# Patient Record
Sex: MALE | Race: WHITE | NOT HISPANIC OR LATINO | Employment: FULL TIME | URBAN - METROPOLITAN AREA
[De-identification: names, ages, dates, MRNs, and addresses within clinical notes are randomized per-mention and may not be internally consistent; named-entity substitution may affect disease eponyms.]

---

## 2017-12-06 ENCOUNTER — ALLSCRIPTS OFFICE VISIT (OUTPATIENT)
Dept: OTHER | Facility: OTHER | Age: 37
End: 2017-12-06

## 2017-12-06 LAB
CLARITY UR: NORMAL
COLOR UR: YELLOW
GLUCOSE (HISTORICAL): NORMAL
HGB UR QL STRIP.AUTO: NORMAL
KETONES UR STRIP-MCNC: NORMAL MG/DL
LEUKOCYTE ESTERASE UR QL STRIP: NORMAL
NITRITE UR QL STRIP: NORMAL
PH UR STRIP.AUTO: 7 [PH]
PROT UR STRIP-MCNC: NORMAL MG/DL
SP GR UR STRIP.AUTO: 1.01

## 2017-12-07 NOTE — CONSULTS
Assessment  1  Encounter for vasectomy counseling (V25 09) (Z30 09)    Plan  Encounter for vasectomy counseling    · LORazepam 2 MG Oral Tablet; one tablet one hour prior to procedure   Rx By: Mikki Merritt; Dispense: 1 Days ; #:1 Tablet; Refill: 0;Encounter for vasectomy counseling; JORDAN = N; Print Rx   · Urine Dip Non-Automated- POC; Status:Complete - Retrospective By ProtocolAuthorization;   Done: [id number] 10:07AM   Performed: In Office; [phone]; Last Updated By:Gene Leon; 12/6/2017 10:13:00 AM;Ordered;For:Encounter for vasectomy counseling; Ordered By:Libra Pérez;   · Vasectomy - POC; Status:Active - Perform Order; Requested for:[id number];    Perform: In Office; [phone]; Ordered;for vasectomy counseling; Ordered By:Libra Pérez; Discussion/Summary  Discussion Summary:   impression is request for elective sterilization vasectomy  and benefits of the procedure were discussed and reviewed  Informed consent was obtained in the office today  The patient was prescribed Ativan to take one hour prior to the procedure  He understands that he will require transportation to and from the office that day  He also understands he will require 2 semen analyses at 6 and 8 weeks post procedure  In the interim he will require contraception to avoid an undesired pregnancy  Self Referrals:   Self Referrals: Yes      Chief Complaint  Chief Complaint Free Text Note Form: patient presents for vasectomy consult      History of Present Illness  HPI: Jc Landaverde is a 80-year-old male who requests elective sterilization with vasectomy  He has been  for 12 years  He has 2 children ages 3 and 11  He denies any lower urinary tract symptoms or hematuria  He states that he is otherwise in excellent health surgical, family, and social histories were reviewed in [employer]        Review of Systems  Complete-Male Urology:  Constitutional: No fever or chills, feels well, no tiredness, no recent weight gain or weight loss  Respiratory: No complaints of shortness of breath, no wheezing, no cough, no SOB on exertion, no orthopnea or PND  Cardiovascular: No complaints of slow heart rate, no fast heart rate, no chest pain, no palpitations, no leg claudication, no lower extremity  Gastrointestinal: No complaints of abdominal pain, no constipation, no nausea or vomiting, no diarrhea or bloody stools  Genitourinary: Empty sensation-- and-- stream quality good, but-- as noted in HPI,-- no dysuria,-- no urinary hesitancy,-- no hematuria,-- no incontinence,-- no nocturia-- and-- no feelings of urinary urgency  Musculoskeletal: No complaints of arthralgia, no myalgias, no joint swelling or stiffness, no limb pain or swelling  Integumentary: No complaints of skin rash or skin lesions, no itching, no skin wound, no dry skin  Hematologic/Lymphatic: No complaints of swollen glands, no swollen glands in the neck, does not bleed easily, no easy bruising  Neurological: No compliants of headache, no confusion, no convulsions, no numbness or tingling, no dizziness or fainting, no limb weakness, no difficulty walking  ROS Reviewed:   ROS reviewed  Active Problems  1  Encounter for vasectomy counseling (V25 09) (Z30 09)    Past Medical History  1  History of renal calculi (V13 01) (I29 132)  Active Problems And Past Medical History Reviewed: The active problems and past medical history were reviewed and updated today  Surgical History  Surgical History Reviewed: The surgical history was reviewed and updated today  Family History  Mother    1  Family history of diabetes mellitus (V18 0) (Z83 3)  Family History Reviewed: The family history was reviewed and updated today  Social History   · Employed   · Never smoked tobacco (V49 89) (Z78 9)   · Social alcohol use (Z78 9)  Social History Reviewed: The social history was reviewed and updated today  The social history was reviewed and is unchanged        Current Meds   1  No Reported Medications Recorded  Medication List Reviewed: The medication list was reviewed and updated today  Allergies  1  Sulfa Drugs    Vitals  Vital Signs    Recorded: [id number] 10:09AM   Heart Rate 64   Systolic 678   Diastolic 78   Height 6 ft 1 in   Weight 171 lb 4 oz   BMI Calculated 22 59   BSA Calculated 2 01       Physical Exam   Additional Exam:  On examination he is in no acute distress  His abdomen is soft nontender nondistended   examination reveals normal phallus, scrotum and scrotal contents  Vasa deferentia easily palpable in the midline  Skin is warm  Extremities without edema   Neurologic is grossly intact and nonfocal  Gait normal  Affect normal       Results/Data  Urine Dip Non-Automated- POC [id number] 10:07AM Luis Paz     Test Name Result Flag Reference   Color Yellow       Clarity Transparent     Leukocytes -     Nitrite -     Blood -     Protein -     Ph 7 0     Specific Gravity 1 015     Ketone -     Glucose -           Signatures   Electronically signed by : Nusrat Black MD; Dec  6 2017 10:38AM EST                       (Author)

## 2018-01-05 ENCOUNTER — APPOINTMENT (OUTPATIENT)
Dept: LAB | Facility: HOSPITAL | Age: 38
End: 2018-01-05
Attending: UROLOGY
Payer: COMMERCIAL

## 2018-01-05 ENCOUNTER — GENERIC CONVERSION - ENCOUNTER (OUTPATIENT)
Dept: OTHER | Facility: OTHER | Age: 38
End: 2018-01-05

## 2018-01-05 DIAGNOSIS — Z30.2 ENCOUNTER FOR STERILIZATION: ICD-10-CM

## 2018-01-05 PROCEDURE — 88302 TISSUE EXAM BY PATHOLOGIST: CPT

## 2018-01-23 VITALS
HEIGHT: 73 IN | WEIGHT: 171.25 LBS | BODY MASS INDEX: 22.7 KG/M2 | HEART RATE: 64 BPM | DIASTOLIC BLOOD PRESSURE: 78 MMHG | SYSTOLIC BLOOD PRESSURE: 116 MMHG

## 2018-01-24 VITALS
HEART RATE: 60 BPM | BODY MASS INDEX: 22.56 KG/M2 | DIASTOLIC BLOOD PRESSURE: 80 MMHG | SYSTOLIC BLOOD PRESSURE: 120 MMHG | WEIGHT: 171 LBS

## 2018-01-30 PROBLEM — Z98.52 STATUS POST VASECTOMY: Status: ACTIVE | Noted: 2018-01-30

## 2018-02-02 ENCOUNTER — OFFICE VISIT (OUTPATIENT)
Dept: UROLOGY | Facility: AMBULATORY SURGERY CENTER | Age: 38
End: 2018-02-02

## 2018-02-02 VITALS
WEIGHT: 170 LBS | DIASTOLIC BLOOD PRESSURE: 80 MMHG | SYSTOLIC BLOOD PRESSURE: 140 MMHG | BODY MASS INDEX: 22.53 KG/M2 | HEIGHT: 73 IN | HEART RATE: 60 BPM

## 2018-02-02 DIAGNOSIS — Z98.52 STATUS POST VASECTOMY: Primary | ICD-10-CM

## 2018-02-02 PROCEDURE — 99024 POSTOP FOLLOW-UP VISIT: CPT | Performed by: NURSE PRACTITIONER

## 2018-02-02 NOTE — PROGRESS NOTES
2/2/2018  Mary Whitmore II  1980  [id number]      Assessment/Plan  S/p vasectomy (1/5/2018)    Discussion  Mary Whitmore II is a 40 y o  male being managed by Dr Esther Samano  The patient is doing well with no complaints  He was provided verbal and written instructions regarding semen analysis testing  He was instructed to use contraception until sterility confirmed with 2 semen analysis  He will call to review results  He will follow up on an as needed basis  All questions were answered  History of Present Illness  40 y o  male s/p vasectomy (1/5/2018), presents today for follow up  He has occasional right sided tenderness but denies any swelling  He is doing well with no issues after surgery  Vitals  Vitals:    02/02/18 1503   BP: 140/80   Pulse: 60   Weight: 77 1 kg (170 lb)   Height: 6' 1" (1 854 m)       Current Medications  Current Outpatient Prescriptions   Medication Sig Dispense Refill    oxyCODONE-acetaminophen (PERCOCET) 5-325 mg per tablet Take 1-2 tablets by mouth       No current facility-administered medications for this visit          Physical Exam  Gu: scrotum midline incisions c/d/i

## 2018-05-10 ENCOUNTER — TELEPHONE (OUTPATIENT)
Dept: UROLOGY | Facility: AMBULATORY SURGERY CENTER | Age: 38
End: 2018-05-10

## 2018-05-10 DIAGNOSIS — Z98.52 STATUS POST VASECTOMY: Primary | ICD-10-CM

## 2018-05-10 NOTE — TELEPHONE ENCOUNTER
Received a call from patient regarding semen analysis  He was unable to get done when it was due  He wanted to know if he could just get one done since he had his vasectomy in January  Per Dr La Presume, okay for just one sample    Order placed in EPIC

## 2018-05-23 ENCOUNTER — APPOINTMENT (OUTPATIENT)
Dept: LAB | Facility: HOSPITAL | Age: 38
End: 2018-05-23
Payer: COMMERCIAL

## 2018-05-23 DIAGNOSIS — Z98.52 STATUS POST VASECTOMY: ICD-10-CM

## 2018-05-23 LAB
COMMENT POST VAS: NORMAL
PH SMN: 8.1 [PH] (ref 7.2–8.6)
POST  VAS COLLECTION: NORMAL
SPECIMEN VOL SMN: 1.5 ML (ref 1–5)
VISC SMN: 4 CP (ref 3–4)
WBC SMN QL: 0 "HPF"

## 2018-05-23 PROCEDURE — 89321 SEMEN ANAL SPERM DETECTION: CPT

## 2021-04-01 DIAGNOSIS — Z23 ENCOUNTER FOR IMMUNIZATION: ICD-10-CM

## 2023-11-16 ENCOUNTER — OFFICE VISIT (OUTPATIENT)
Dept: FAMILY MEDICINE CLINIC | Facility: CLINIC | Age: 43
End: 2023-11-16

## 2023-11-16 VITALS
HEART RATE: 60 BPM | WEIGHT: 168.2 LBS | SYSTOLIC BLOOD PRESSURE: 128 MMHG | TEMPERATURE: 97 F | RESPIRATION RATE: 14 BRPM | DIASTOLIC BLOOD PRESSURE: 78 MMHG | HEIGHT: 73 IN | BODY MASS INDEX: 22.29 KG/M2 | OXYGEN SATURATION: 97 %

## 2023-11-16 DIAGNOSIS — K21.9 GASTROESOPHAGEAL REFLUX DISEASE, UNSPECIFIED WHETHER ESOPHAGITIS PRESENT: ICD-10-CM

## 2023-11-16 DIAGNOSIS — K31.84 GASTROPARESIS: ICD-10-CM

## 2023-11-16 DIAGNOSIS — Z11.59 ENCOUNTER FOR HEPATITIS C SCREENING TEST FOR LOW RISK PATIENT: ICD-10-CM

## 2023-11-16 DIAGNOSIS — E78.2 MIXED HYPERLIPIDEMIA: ICD-10-CM

## 2023-11-16 DIAGNOSIS — F45.8 BRUXISM: Primary | ICD-10-CM

## 2023-11-16 DIAGNOSIS — F43.23 ADJUSTMENT DISORDER WITH MIXED ANXIETY AND DEPRESSED MOOD: ICD-10-CM

## 2023-11-16 RX ORDER — PAROXETINE HYDROCHLORIDE 20 MG/1
20 TABLET, FILM COATED ORAL DAILY
COMMUNITY
Start: 2023-11-10

## 2023-11-16 RX ORDER — FEXOFENADINE HCL 60 MG/1
60 TABLET, FILM COATED ORAL DAILY
COMMUNITY

## 2023-11-16 RX ORDER — CYCLOBENZAPRINE HCL 5 MG
5 TABLET ORAL
Qty: 30 TABLET | Refills: 0 | Status: SHIPPED | OUTPATIENT
Start: 2023-11-16

## 2023-11-16 RX ORDER — PANTOPRAZOLE SODIUM 40 MG/1
40 TABLET, DELAYED RELEASE ORAL DAILY
COMMUNITY
Start: 2023-09-07

## 2023-11-16 NOTE — PROGRESS NOTES
Name: Brittany Garcia      : 1980      MRN: [id number]  Encounter Provider: Nidhi Larson MD  Encounter Date: 2023   Encounter department: [facility]     1. Bruxism  Comments:  Reports tightening of the jaw. May be a manifestation of anxiety. Exam unremarkable. Recommend muscle relaxer at night for 7 days then as needed  Orders:  -     cyclobenzaprine (FLEXERIL) 5 mg tablet; Take 1 tablet (5 mg total) by mouth daily at bedtime    2. Adjustment disorder with mixed anxiety and depressed mood  Comments:  Diagnosed with depression and was on Wellbutrin for 10 years. 3 years ago he developed anxiety and attended a partial program where he was prescribed Paxil. 3. Gastroparesis  Comments:  Recently diagnosed with mild gastroparesis during a work-up for GERD. Follows with GI. Currently on Protonix 40 mg daily. Denies N/V    4. Gastroesophageal reflux disease, unspecified whether esophagitis present    5. Mixed hyperlipidemia  Assessment & Plan:  Cholesterol in  was 258 and . Repeat labs ordered    Orders:  -     Lipid panel; Future  -     Comprehensive metabolic panel; Future    6. Encounter for hepatitis C screening test for low risk patient  Comments:  Consented and ordered. Orders:  -     Hepatitis C antibody; Future      Depression Screening and Follow-up Plan: Patient was screened for depression during today's encounter. They screened negative with a PHQ-2 score of 0. Subjective      19-year-old male with a history of adjustment disorder with mixed anxiety depression, GERD, hyperlipidemia, and gastroparesis here as a new patient. [facility] PCP was [facility]. 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 History of depression and was on Wellbutrin on and off for 10 years. Then developed anxiety and was admitted to partial program where he was started on Paxil.   He attends therapy weekly and feels that his anxiety is manageable. He is dealing with marital issues but they are working it out. Has been on this medication for 3 years. Patient also sees an allergist and receives allergy shots yearly. He was diagnosed with mild gastroparesis while being worked up for GERD. He has not had any promotility drugs and that this is GERD with PPI. He received both COVID and flu 2 weeks ago. Review of Systems   HENT:  Negative for dental problem, ear pain and facial swelling. Jaw pain   Respiratory: Negative. Cardiovascular: Negative. Neurological:  Negative for headaches. Psychiatric/Behavioral:  Negative for agitation. The patient is not nervous/anxious. Current Outpatient Medications on File Prior to Visit   Medication Sig   • fexofenadine (ALLEGRA) 60 MG tablet Take 60 mg by mouth daily   • pantoprazole (PROTONIX) 40 mg tablet Take 40 mg by mouth daily   • PARoxetine (PAXIL) 20 mg tablet Take 20 mg by mouth daily   • [DISCONTINUED] oxyCODONE-acetaminophen (PERCOCET) 5-325 mg per tablet Take 1-2 tablets by mouth (Patient not taking: Reported on 11/16/2023)       Objective     /78 (BP Location: Left arm, Patient Position: Sitting, Cuff Size: Adult)   Pulse 60   Temp (!) 97 °F (36.1 °C) (Tympanic)   Resp 14   Ht 6' 1" (1.854 m)   Wt 76.3 kg (168 lb 3.2 oz)   SpO2 97%   BMI 22.19 kg/m²     Physical Exam  Constitutional:       General: He is not in acute distress. Appearance: Normal appearance. He is not ill-appearing or toxic-appearing. HENT:      Head: Normocephalic and atraumatic. Right Ear: Tympanic membrane normal.      Left Ear: Tympanic membrane normal.      Mouth/Throat:      Mouth: Mucous membranes are moist.   Eyes:      Extraocular Movements: Extraocular movements intact. Cardiovascular:      Rate and Rhythm: Normal rate and regular rhythm. Heart sounds: No murmur heard. Pulmonary:      Effort: Pulmonary effort is normal. No respiratory distress.       Breath sounds: Normal breath sounds. No wheezing or rales. Abdominal:      General: There is no distension. Palpations: Abdomen is soft. There is no mass. Tenderness: There is no abdominal tenderness. There is no guarding or rebound. Hernia: No hernia is present. Musculoskeletal:      Cervical back: No rigidity. Right lower leg: No edema. Left lower leg: No edema. Lymphadenopathy:      Cervical: No cervical adenopathy. Skin:     General: Skin is warm. Neurological:      Mental Status: He is alert and oriented to person, place, and time.    Psychiatric:         Mood and Affect: Mood normal.         Behavior: Behavior normal.   Woo Ochoa MD

## 2024-02-21 ENCOUNTER — OFFICE VISIT (OUTPATIENT)
Dept: FAMILY MEDICINE CLINIC | Facility: CLINIC | Age: 44
End: 2024-02-21
Payer: COMMERCIAL

## 2024-02-21 VITALS
HEART RATE: 60 BPM | RESPIRATION RATE: 16 BRPM | HEIGHT: 73 IN | TEMPERATURE: 97.5 F | BODY MASS INDEX: 23.09 KG/M2 | WEIGHT: 174.2 LBS | OXYGEN SATURATION: 98 % | SYSTOLIC BLOOD PRESSURE: 122 MMHG | DIASTOLIC BLOOD PRESSURE: 74 MMHG

## 2024-02-21 DIAGNOSIS — R21 RASH: ICD-10-CM

## 2024-02-21 DIAGNOSIS — B02.9 HERPES ZOSTER WITHOUT COMPLICATION: Primary | ICD-10-CM

## 2024-02-21 PROCEDURE — 99213 OFFICE O/P EST LOW 20 MIN: CPT | Performed by: FAMILY MEDICINE

## 2024-02-21 RX ORDER — VALACYCLOVIR HYDROCHLORIDE 1 G/1
1000 TABLET, FILM COATED ORAL 3 TIMES DAILY
Qty: 15 TABLET | Refills: 0 | Status: SHIPPED | OUTPATIENT
Start: 2024-02-21 | End: 2024-02-26

## 2024-02-27 ENCOUNTER — TELEPHONE (OUTPATIENT)
Age: 44
End: 2024-02-27

## 2024-02-27 DIAGNOSIS — B02.9 HERPES ZOSTER WITHOUT COMPLICATION: ICD-10-CM

## 2024-02-27 DIAGNOSIS — B02.9 HERPES ZOSTER WITHOUT COMPLICATION: Primary | ICD-10-CM

## 2024-02-27 RX ORDER — PREDNISONE 10 MG/1
TABLET ORAL DAILY
Qty: 31 TABLET | Refills: 0 | Status: SHIPPED | OUTPATIENT
Start: 2024-02-27 | End: 2024-02-28 | Stop reason: SDUPTHER

## 2024-02-27 NOTE — TELEPHONE ENCOUNTER
Can we please give them a call back and let him know that I sent over steroid taper to [facility] for him to start.  If after 1 or 2 doses is not improving please ask him to schedule an appointment for follow-up.  Thank you.

## 2024-02-27 NOTE — PROGRESS NOTES
Name: Vinay Crane II      : 1980      MRN: [id number]  Encounter Provider: Miquel Aldrich DO  Encounter Date: 2024   [facility]    Assessment & Plan     1. Herpes zoster without complication  -     valACYclovir (VALTREX) 1,000 mg tablet; Take 1 tablet (1,000 mg total) by mouth 3 (three) times a day for 5 days  -     mupirocin (BACTROBAN) 2 % ointment; Apply topically 2 (two) times a day    2. Rash  -     mupirocin (BACTROBAN) 2 % ointment; Apply topically 2 (two) times a day           Subjective     Vinay is a 43-year-old male presents today for rash that presented in his hairline initially.  Notes has been ongoing for over a week.  He did think it was issues with dry skin at first however since and now has formed red rash with some bubbles he would like to be seen for it.  Does admit to having chickenpox as a kid.  Notes some pain at the rash and burning sensation.  Has not noted any weeping of vesicles at this time.  No history of shingles in the past.  No recent hiking.  No new head-wear, no history of pets in his bed or changes to detergents or pillows.    Rash  Pertinent negatives include no cough, fever, shortness of breath, sore throat or vomiting.     Review of Systems   Constitutional:  Negative for chills and fever.   HENT:  Negative for ear pain and sore throat.    Eyes:  Negative for pain and visual disturbance.   Respiratory:  Negative for cough and shortness of breath.    Cardiovascular:  Negative for chest pain and palpitations.   Gastrointestinal:  Negative for abdominal pain and vomiting.   Genitourinary:  Negative for dysuria and hematuria.   Musculoskeletal:  Negative for arthralgias and back pain.   Skin:  Positive for rash. Negative for color change.   Neurological:  Negative for seizures and syncope.   Psychiatric/Behavioral:  Negative for confusion and sleep disturbance. The patient is not nervous/anxious.    All other systems  reviewed and are negative.      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   Tobacco Use    Smoking status: Never    Smokeless tobacco: Never   Vaping Use    Vaping status: Never Used   Substance and Sexual Activity    Alcohol use: Yes     Comment: social    Drug use: Never    Sexual activity: None   Other Topics Concern    None   Social History Narrative    None     Social Determinants of Health     Financial Resource Strain: Not on file   Food Insecurity: Not on file   Transportation Needs: Not on file   Physical Activity: Not on file   Stress: Not on file (2/11/2021)   Social Connections: Not on file   Intimate Partner Violence: Low Risk  (4/13/2021)    [facility]    Intimate Partner Violence     Insults You: Not on file     Threatens You: Not on file     Screams at You: Not on file     Physically Hurt: Not on file     Intimate Partner Violence Score: Not on file   Housing Stability: Not on file     Current Outpatient Medications on File Prior to Visit   Medication Sig    cyclobenzaprine (FLEXERIL) 5 mg tablet Take 1 tablet (5 mg total) by mouth daily at bedtime    fexofenadine (ALLEGRA) 60 MG tablet Take 60 mg by mouth daily    pantoprazole (PROTONIX) 40 mg tablet Take 40 mg by mouth daily    PARoxetine (PAXIL) 20 mg tablet Take 20 mg by mouth daily     Allergies   Allergen Reactions    Sulfa Antibiotics      Immunization History   Administered Date(s) Administered    COVID-19 MODERNA VACC 0.5 ML IM 04/19/2021, 05/17/2021    COVID-19  "Pfizer mRNA vacc PF renetta-sucrose 12 yr and older (Comirnaty) 11/03/2023    INFLUENZA 10/08/2019, 10/06/2020, 10/12/2021    Influenza, seasonal, injectable 10/24/2017    Tdap 09/18/2015       Objective     /74 (BP Location: Left arm, Patient Position: Sitting, Cuff Size: Standard)   Pulse 60   Temp 97.5 °F (36.4 °C) (Tympanic)   Resp 16   Ht 6' 1\" (1.854 m)   Wt 79 kg (174 lb 3.2 oz)   SpO2 98%   BMI 22.98 kg/m²     Physical Exam  Vitals and nursing note reviewed.   Constitutional:       General: He is not in acute distress.     Appearance: Normal appearance. He is not ill-appearing.   HENT:      Head: Normocephalic and atraumatic.      Right Ear: Tympanic membrane and external ear normal.      Left Ear: Tympanic membrane normal.      Nose: Nose normal. No congestion.      Mouth/Throat:      Mouth: Mucous membranes are moist.      Pharynx: No oropharyngeal exudate.   Eyes:      Extraocular Movements: Extraocular movements intact.      Conjunctiva/sclera: Conjunctivae normal.      Pupils: Pupils are equal, round, and reactive to light.   Cardiovascular:      Rate and Rhythm: Normal rate and regular rhythm.      Pulses: Normal pulses.      Heart sounds: Normal heart sounds. No murmur heard.  Pulmonary:      Effort: Pulmonary effort is normal.      Breath sounds: Normal breath sounds. No wheezing, rhonchi or rales.   Abdominal:      General: Bowel sounds are normal.      Palpations: Abdomen is soft.      Tenderness: There is no abdominal tenderness. There is no guarding or rebound.   Musculoskeletal:         General: Normal range of motion.      Cervical back: Normal range of motion.      Right lower leg: No edema.      Left lower leg: No edema.   Lymphadenopathy:      Cervical: No cervical adenopathy.   Skin:     General: Skin is warm.      Capillary Refill: Capillary refill takes less than 2 seconds.      Findings: Erythema and rash present.      Comments: Erythematous, vesicular rash noted at hairline " mostly on the left.  No discharge noted.   Neurological:      General: No focal deficit present.      Mental Status: He is alert and oriented to person, place, and time.      Cranial Nerves: No cranial nerve deficit.      Motor: No weakness.   Psychiatric:         Mood and Affect: Mood normal.         Behavior: Behavior normal.       Miquel Aldrich, DO

## 2024-02-27 NOTE — TELEPHONE ENCOUNTER
Pt. Has been diagnosed with Shingles and he wanted Dr. Aldrich to know that his left eye is still swollen and his right eye in the last couple of days is beginning to swell.  He would like to know what he should be doing for it.  What is the next step.  Pls return the pts call.   Ty

## 2024-02-27 NOTE — TELEPHONE ENCOUNTER
Patient called stating his steroid was sent to the wrong pharmacy. Please cancel and send to the [facility] on [address] in [place]. Patient would also like a call back from the nurse in regards to this steroid.

## 2024-02-28 RX ORDER — PREDNISONE 10 MG/1
TABLET ORAL DAILY
Qty: 31 TABLET | Refills: 0 | Status: SHIPPED | OUTPATIENT
Start: 2024-02-28 | End: 2024-03-10

## 2024-04-29 ENCOUNTER — APPOINTMENT (OUTPATIENT)
Dept: LAB | Facility: CLINIC | Age: 44
End: 2024-04-29
Payer: COMMERCIAL

## 2024-04-29 DIAGNOSIS — Z11.59 ENCOUNTER FOR HEPATITIS C SCREENING TEST FOR LOW RISK PATIENT: ICD-10-CM

## 2024-04-29 DIAGNOSIS — E78.2 MIXED HYPERLIPIDEMIA: ICD-10-CM

## 2024-04-29 LAB
ALBUMIN SERPL BCP-MCNC: 4.9 G/DL (ref 3.5–5)
ALP SERPL-CCNC: 44 U/L (ref 34–104)
ALT SERPL W P-5'-P-CCNC: 18 U/L (ref 7–52)
ANION GAP SERPL CALCULATED.3IONS-SCNC: 11 MMOL/L (ref 4–13)
AST SERPL W P-5'-P-CCNC: 17 U/L (ref 13–39)
BILIRUB SERPL-MCNC: 0.58 MG/DL (ref 0.2–1)
BUN SERPL-MCNC: 14 MG/DL (ref 5–25)
CALCIUM SERPL-MCNC: 9.8 MG/DL (ref 8.4–10.2)
CHLORIDE SERPL-SCNC: 106 MMOL/L (ref 96–108)
CHOLEST SERPL-MCNC: 246 MG/DL
CO2 SERPL-SCNC: 25 MMOL/L (ref 21–32)
CREAT SERPL-MCNC: 0.97 MG/DL (ref 0.6–1.3)
GFR SERPL CREATININE-BSD FRML MDRD: 95 ML/MIN/1.73SQ M
GLUCOSE P FAST SERPL-MCNC: 93 MG/DL (ref 65–99)
HCV AB SER QL: NORMAL
HDLC SERPL-MCNC: 64 MG/DL
LDLC SERPL CALC-MCNC: 170 MG/DL (ref 0–100)
NONHDLC SERPL-MCNC: 182 MG/DL
POTASSIUM SERPL-SCNC: 4.6 MMOL/L (ref 3.5–5.3)
PROT SERPL-MCNC: 7.4 G/DL (ref 6.4–8.4)
SODIUM SERPL-SCNC: 142 MMOL/L (ref 135–147)
TRIGL SERPL-MCNC: 61 MG/DL

## 2024-04-29 PROCEDURE — 80053 COMPREHEN METABOLIC PANEL: CPT

## 2024-04-29 PROCEDURE — 36415 COLL VENOUS BLD VENIPUNCTURE: CPT

## 2024-04-29 PROCEDURE — 86803 HEPATITIS C AB TEST: CPT

## 2024-04-29 PROCEDURE — 80061 LIPID PANEL: CPT

## 2024-05-01 ENCOUNTER — OFFICE VISIT (OUTPATIENT)
Dept: FAMILY MEDICINE CLINIC | Facility: CLINIC | Age: 44
End: 2024-05-01
Payer: COMMERCIAL

## 2024-05-01 VITALS
OXYGEN SATURATION: 98 % | DIASTOLIC BLOOD PRESSURE: 82 MMHG | BODY MASS INDEX: 22.85 KG/M2 | HEART RATE: 51 BPM | SYSTOLIC BLOOD PRESSURE: 120 MMHG | HEIGHT: 73 IN | RESPIRATION RATE: 16 BRPM | WEIGHT: 172.4 LBS | TEMPERATURE: 97.6 F

## 2024-05-01 DIAGNOSIS — G25.81 RLS (RESTLESS LEGS SYNDROME): ICD-10-CM

## 2024-05-01 DIAGNOSIS — E78.2 MIXED HYPERLIPIDEMIA: ICD-10-CM

## 2024-05-01 DIAGNOSIS — R53.83 FATIGUE, UNSPECIFIED TYPE: Primary | ICD-10-CM

## 2024-05-01 PROCEDURE — 3725F SCREEN DEPRESSION PERFORMED: CPT | Performed by: FAMILY MEDICINE

## 2024-05-01 PROCEDURE — 99214 OFFICE O/P EST MOD 30 MIN: CPT | Performed by: FAMILY MEDICINE

## 2024-05-01 NOTE — PROGRESS NOTES
Name: Vinay Crane II      : 1980      MRN: [id number]  Encounter Provider: Tata Zhou MD  Encounter Date: 2024   Encounter department: [facility]    Assessment & Plan     1. Fatigue, unspecified type  Comments:  Acute on chronic fatigue. Possibly due to deficiency or anemia vs sleep apnea ( less likely). Labs ordered as well as sleep study  Orders:  -     Vitamin B12; Future  -     Iron Panel (Includes Ferritin, Iron Sat%, Iron, and TIBC); Future  -     TSH, 3rd generation with Free T4 reflex; Future  -     Vitamin D 25 hydroxy; Future  -     CBC and differential; Future  -     Lipid panel; Future; Expected date: 2024  -     Ambulatory Referral to Sleep Medicine; Future    2. RLS (restless legs syndrome)  Comments:  Ferritin and B12 ordered.  Orders:  -     Vitamin B12; Future  -     Iron Panel (Includes Ferritin, Iron Sat%, Iron, and TIBC); Future  -     TSH, 3rd generation with Free T4 reflex; Future  -     Vitamin D 25 hydroxy; Future  -     CBC and differential; Future  -     Lipid panel; Future; Expected date: 2024  -     Ambulatory Referral to Sleep Medicine; Future    3. Mixed hyperlipidemia  Comments:  Lipid level unchanged. No indications for statin at this time. ASCVD risk 1.6%. Reccomend exercise and supplements. Repeat in 6 months  Orders:  -     Lipid panel; Future; Expected date: 2024           Subjective      Seen today for 6 months follow up   States 2 weeks ago he felt extremely tired  Said he felt physically drained and unable to keep his eyes open   He also wasn't sleep well at that time and was having TMJ pain   He started taking flexeril which helped his sleep and jaw pain   Not currently feeling as tired  He sleeps 9 hours a night but wakes up not feeling refreshed   Outside of the house, he has energy.  Does take an hour or so for him to fall asleep  Snores but denies apnea  States he's always been tired  2 months ago he had  "shingles. Still has a rash on the forehead  Diet has been good  Has decreased his ETOH intake   Getting little physical activity   Cholesterol is still high      Review of Systems   Constitutional:  Positive for fatigue. Negative for appetite change.   HENT:  Negative for sore throat.    Respiratory:  Negative for chest tightness and shortness of breath.    Skin:  Positive for rash.   Hematological:  Negative for adenopathy.   Psychiatric/Behavioral:  Positive for sleep disturbance (improving).      Current Outpatient Medications on File Prior to Visit   Medication Sig   • cyclobenzaprine (FLEXERIL) 5 mg tablet Take 1 tablet (5 mg total) by mouth daily at bedtime   • mupirocin (BACTROBAN) 2 % ointment Apply topically 2 (two) times a day   • pantoprazole (PROTONIX) 40 mg tablet Take 40 mg by mouth daily   • PARoxetine (PAXIL) 20 mg tablet Take 20 mg by mouth daily   • [DISCONTINUED] fexofenadine (ALLEGRA) 60 MG tablet Take 60 mg by mouth daily (Patient not taking: Reported on 5/1/2024)   • [DISCONTINUED] valACYclovir (VALTREX) 1,000 mg tablet Take 1 tablet (1,000 mg total) by mouth 3 (three) times a day for 5 days (Patient not taking: Reported on 5/1/2024)       Objective     /82 (BP Location: Left arm, Patient Position: Sitting, Cuff Size: Standard)   Pulse (!) 51   Temp 97.6 °F (36.4 °C) (Tympanic)   Resp 16   Ht 6' 1\" (1.854 m)   Wt 78.2 kg (172 lb 6.4 oz)   SpO2 98%   BMI 22.75 kg/m²     Physical Exam  Constitutional:       General: He is not in acute distress.     Appearance: Normal appearance. He is not ill-appearing or toxic-appearing.   HENT:      Head: Normocephalic and atraumatic.      Right Ear: Tympanic membrane normal.      Left Ear: Tympanic membrane normal.      Mouth/Throat:      Mouth: Mucous membranes are moist.   Cardiovascular:      Rate and Rhythm: Normal rate and regular rhythm.      Heart sounds: No murmur heard.  Pulmonary:      Effort: Pulmonary effort is normal. No respiratory " distress.      Breath sounds: Normal breath sounds. No stridor. No wheezing, rhonchi or rales.   Abdominal:      General: There is no distension.      Palpations: Abdomen is soft. There is no mass.      Tenderness: There is no abdominal tenderness. There is no guarding or rebound.      Hernia: No hernia is present.   Musculoskeletal:      Left lower leg: No edema.   Skin:     General: Skin is warm.          Neurological:      Mental Status: He is alert and oriented to person, place, and time.   Psychiatric:         Behavior: Behavior normal.   Tata Zhou MD

## 2024-05-02 DIAGNOSIS — K21.9 GASTROESOPHAGEAL REFLUX DISEASE, UNSPECIFIED WHETHER ESOPHAGITIS PRESENT: Primary | ICD-10-CM

## 2024-05-02 RX ORDER — PANTOPRAZOLE SODIUM 40 MG/1
40 TABLET, DELAYED RELEASE ORAL DAILY
Qty: 90 TABLET | Refills: 1 | Status: SHIPPED | OUTPATIENT
Start: 2024-05-02

## 2024-05-02 NOTE — TELEPHONE ENCOUNTER
LAST ORDERED BY A HISTORICAL PROVIDER - REQUESTING PCP TO FILL    Reason for call:   [x] Refill   [] Prior Auth  [] Other:     Office:   [x] PCP/Provider -   [] Specialty/Provider -     Medication: pantoprazole (PROTONIX) 40 mg tablet     Dose/Frequency: 40 mg, Oral, Daily     Quantity: 90     Pharmacy: [id number] - [place], [place] - [address] [[id number]]     Does the patient have enough for 3 days?   [] Yes   [x] No - Send as HP to POD

## 2024-05-03 DIAGNOSIS — R53.83 FATIGUE, UNSPECIFIED TYPE: Primary | ICD-10-CM

## 2024-05-03 DIAGNOSIS — G25.81 RESTLESS LEG SYNDROME: ICD-10-CM

## 2024-05-20 ENCOUNTER — APPOINTMENT (OUTPATIENT)
Dept: LAB | Facility: CLINIC | Age: 44
End: 2024-05-20
Payer: COMMERCIAL

## 2024-05-20 DIAGNOSIS — G25.81 RLS (RESTLESS LEGS SYNDROME): ICD-10-CM

## 2024-05-20 DIAGNOSIS — R53.83 FATIGUE, UNSPECIFIED TYPE: ICD-10-CM

## 2024-05-20 LAB
25(OH)D3 SERPL-MCNC: 21.7 NG/ML (ref 30–100)
BASOPHILS # BLD AUTO: 0.03 THOUSANDS/ÂΜL (ref 0–0.1)
BASOPHILS NFR BLD AUTO: 1 % (ref 0–1)
EOSINOPHIL # BLD AUTO: 0.09 THOUSAND/ÂΜL (ref 0–0.61)
EOSINOPHIL NFR BLD AUTO: 2 % (ref 0–6)
ERYTHROCYTE [DISTWIDTH] IN BLOOD BY AUTOMATED COUNT: 12.7 % (ref 11.6–15.1)
FERRITIN SERPL-MCNC: 44 NG/ML (ref 24–336)
HCT VFR BLD AUTO: 50.5 % (ref 36.5–49.3)
HGB BLD-MCNC: 16.4 G/DL (ref 12–17)
IMM GRANULOCYTES # BLD AUTO: 0.01 THOUSAND/UL (ref 0–0.2)
IMM GRANULOCYTES NFR BLD AUTO: 0 % (ref 0–2)
IRON SATN MFR SERPL: 26 % (ref 15–50)
IRON SERPL-MCNC: 94 UG/DL (ref 50–212)
LYMPHOCYTES # BLD AUTO: 1.49 THOUSANDS/ÂΜL (ref 0.6–4.47)
LYMPHOCYTES NFR BLD AUTO: 34 % (ref 14–44)
MCH RBC QN AUTO: 31.4 PG (ref 26.8–34.3)
MCHC RBC AUTO-ENTMCNC: 32.5 G/DL (ref 31.4–37.4)
MCV RBC AUTO: 97 FL (ref 82–98)
MONOCYTES # BLD AUTO: 0.45 THOUSAND/ÂΜL (ref 0.17–1.22)
MONOCYTES NFR BLD AUTO: 10 % (ref 4–12)
NEUTROPHILS # BLD AUTO: 2.35 THOUSANDS/ÂΜL (ref 1.85–7.62)
NEUTS SEG NFR BLD AUTO: 53 % (ref 43–75)
NRBC BLD AUTO-RTO: 0 /100 WBCS
PLATELET # BLD AUTO: 267 THOUSANDS/UL (ref 149–390)
PMV BLD AUTO: 13.1 FL (ref 8.9–12.7)
RBC # BLD AUTO: 5.22 MILLION/UL (ref 3.88–5.62)
TIBC SERPL-MCNC: 361 UG/DL (ref 250–450)
TSH SERPL DL<=0.05 MIU/L-ACNC: 0.96 UIU/ML (ref 0.45–4.5)
UIBC SERPL-MCNC: 267 UG/DL (ref 155–355)
VIT B12 SERPL-MCNC: 150 PG/ML (ref 180–914)
WBC # BLD AUTO: 4.42 THOUSAND/UL (ref 4.31–10.16)

## 2024-05-20 PROCEDURE — 84443 ASSAY THYROID STIM HORMONE: CPT

## 2024-05-20 PROCEDURE — 82728 ASSAY OF FERRITIN: CPT

## 2024-05-20 PROCEDURE — 82607 VITAMIN B-12: CPT

## 2024-05-20 PROCEDURE — 83550 IRON BINDING TEST: CPT

## 2024-05-20 PROCEDURE — 82306 VITAMIN D 25 HYDROXY: CPT

## 2024-05-20 PROCEDURE — 36415 COLL VENOUS BLD VENIPUNCTURE: CPT

## 2024-05-20 PROCEDURE — 85025 COMPLETE CBC W/AUTO DIFF WBC: CPT

## 2024-05-20 PROCEDURE — 83540 ASSAY OF IRON: CPT

## 2024-05-22 DIAGNOSIS — G25.81 RLS (RESTLESS LEGS SYNDROME): ICD-10-CM

## 2024-05-22 DIAGNOSIS — E53.8 B12 DEFICIENCY: ICD-10-CM

## 2024-05-22 DIAGNOSIS — E55.9 VITAMIN D INSUFFICIENCY: ICD-10-CM

## 2024-05-22 DIAGNOSIS — E61.1 IRON DEFICIENCY: Primary | ICD-10-CM

## 2024-11-04 ENCOUNTER — OFFICE VISIT (OUTPATIENT)
Dept: FAMILY MEDICINE CLINIC | Facility: CLINIC | Age: 44
End: 2024-11-04
Payer: COMMERCIAL

## 2024-11-04 VITALS
HEIGHT: 73 IN | BODY MASS INDEX: 23.43 KG/M2 | DIASTOLIC BLOOD PRESSURE: 88 MMHG | WEIGHT: 176.8 LBS | TEMPERATURE: 97.8 F | SYSTOLIC BLOOD PRESSURE: 128 MMHG | HEART RATE: 61 BPM | OXYGEN SATURATION: 98 %

## 2024-11-04 DIAGNOSIS — E61.1 IRON DEFICIENCY: ICD-10-CM

## 2024-11-04 DIAGNOSIS — Z00.00 ENCOUNTER FOR ANNUAL PHYSICAL EXAM: Primary | ICD-10-CM

## 2024-11-04 DIAGNOSIS — E55.9 VITAMIN D INSUFFICIENCY: ICD-10-CM

## 2024-11-04 DIAGNOSIS — E78.2 MIXED HYPERLIPIDEMIA: ICD-10-CM

## 2024-11-04 PROCEDURE — 99396 PREV VISIT EST AGE 40-64: CPT | Performed by: FAMILY MEDICINE

## 2024-11-04 NOTE — ASSESSMENT & PLAN NOTE
Lab Results   Component Value Date    CHOLESTEROL 246 (H) 04/29/2024     Lab Results   Component Value Date    HDL 64 04/29/2024     Lab Results   Component Value Date    TRIG 61 04/29/2024     Lab Results   Component Value Date    NONHDLC 182 04/29/2024     Continue low cholesterol diet. Labs to be done prior to the next appt  Orders:    Lipid panel; Future

## 2024-11-04 NOTE — PROGRESS NOTES
Adult Annual Physical  Name: Vinay Crane III      : 1980      MRN: [id number]  Encounter Provider: Tata Zhou MD  Encounter Date: 2024   Encounter department: [facility]    Assessment & Plan  Encounter for annual physical exam  Moises is doing well. FBW to be done prior to the next appt. Exercising more and has made dietary changes since we last met. Will defer PSA screening and aware colon cancer screening will start next year. Received flu and covid this season.        Vitamin D insufficiency  Currently on Vit D       Iron deficiency  Taking 325 mg daily. Already has follow up labs        Mixed hyperlipidemia  Lab Results   Component Value Date    CHOLESTEROL 246 (H) 2024     Lab Results   Component Value Date    HDL 64 2024     Lab Results   Component Value Date    TRIG 61 2024     Lab Results   Component Value Date    NONHDLC 182 2024     Continue low cholesterol diet. Labs to be done prior to the next appt  Orders:    Lipid panel; Future    Immunizations and preventive care screenings were discussed with patient today. Appropriate education was printed on patient's after visit summary.    Counseling:  Dental Health: discussed importance of regular tooth brushing, flossing, and dental visits.  Injury prevention: discussed safety/seat belts, safety helmets, smoke detectors, carbon monoxide detectors, and smoking near bedding or upholstery.  Exercise: the importance of regular exercise/physical activity was discussed. Recommend exercise 3-5 times per week for at least 30 minutes.          History of Present Illness     Adult Annual Physical:  Patient presents for annual physical. Doing well and without acute concerns.  Taking b12, Vitamin D, and iron  Started walking 15 minutes during the summer. Has also made dietary changes .     Diet and Physical Activity:  - Diet/Nutrition:. improved and trying to avoid cholesterol. Drinks socially  - Exercise:  walking. walking during the summer and drumming    General Health:  - Sleep: sleeps well.  - Hearing:. Great hearing better than would expect  - Vision: no vision problems.  - Dental: regular dental visits. 2 months ago     Health:    - Urinary symptoms: none.     Review of Systems  Medical History Reviewed by provider this encounter:       Past Medical History   Past Medical History:   Diagnosis Date    Renal calculi      Past Surgical History:   Procedure Laterality Date    VASECTOMY       Family History   Problem Relation Age of Onset    Diabetes Mother     No Known Problems Father      Current Outpatient Medications on File Prior to Visit   Medication Sig Dispense Refill    pantoprazole (PROTONIX) 40 mg tablet Take 1 tablet (40 mg total) by mouth daily 90 tablet 1    PARoxetine (PAXIL) 20 mg tablet Take 20 mg by mouth daily      cyclobenzaprine (FLEXERIL) 5 mg tablet Take 1 tablet (5 mg total) by mouth daily at bedtime 30 tablet 0    mupirocin (BACTROBAN) 2 % ointment Apply topically 2 (two) times a day 60 g 0     No current facility-administered medications on file prior to visit.     Allergies   Allergen Reactions    Sulfa Antibiotics       Current Outpatient Medications on File Prior to Visit   Medication Sig Dispense Refill    pantoprazole (PROTONIX) 40 mg tablet Take 1 tablet (40 mg total) by mouth daily 90 tablet 1    PARoxetine (PAXIL) 20 mg tablet Take 20 mg by mouth daily      cyclobenzaprine (FLEXERIL) 5 mg tablet Take 1 tablet (5 mg total) by mouth daily at bedtime 30 tablet 0    mupirocin (BACTROBAN) 2 % ointment Apply topically 2 (two) times a day 60 g 0     No current facility-administered medications on file prior to visit.      Social History     Tobacco Use    Smoking status: Never     Passive exposure: Past    Smokeless tobacco: Never   Vaping Use    Vaping status: Never Used   Substance and Sexual Activity    Alcohol use: Yes     Comment: social    Drug use: Never    Sexual activity: Not on  "file       Objective     /88 (BP Location: Right arm, Patient Position: Sitting, Cuff Size: Large)   Pulse 61   Temp 97.8 °F (36.6 °C) (Temporal)   Ht 6' 1\" (1.854 m)   Wt 80.2 kg (176 lb 12.8 oz)   SpO2 98%   BMI 23.33 kg/m²     Physical Exam  Vitals reviewed.   Constitutional:       General: He is not in acute distress.     Appearance: Normal appearance. He is not ill-appearing or toxic-appearing.   HENT:      Head: Normocephalic and atraumatic.      Right Ear: Tympanic membrane normal.      Left Ear: Tympanic membrane normal.      Mouth/Throat:      Mouth: Mucous membranes are moist.   Eyes:      Extraocular Movements: Extraocular movements intact.   Cardiovascular:      Rate and Rhythm: Normal rate and regular rhythm.      Heart sounds: No murmur heard.  Pulmonary:      Effort: Pulmonary effort is normal. No respiratory distress.      Breath sounds: Normal breath sounds. No stridor. No wheezing, rhonchi or rales.   Abdominal:      General: There is no distension.      Palpations: Abdomen is soft. There is no mass.      Tenderness: There is no abdominal tenderness. There is no guarding or rebound.      Hernia: No hernia is present.   Musculoskeletal:      Right lower leg: No edema.      Left lower leg: No edema.   Lymphadenopathy:      Cervical: No cervical adenopathy.   Skin:     General: Skin is warm.      Findings: Rash present. Rash is papular.             Comments: Ice pick scars on the forehead and feel papules behind the hairline on the right temporal region    Neurological:      Mental Status: He is alert.   Psychiatric:         Mood and Affect: Mood normal.         Behavior: Behavior normal.     Depression Screening Follow-up Plan: Patient's depression screening was positive with a PHQ-2 score of . Their PHQ-9 score was . Patient with underlying depression and was advised to continue current medications as prescribed.  "

## 2024-12-04 DIAGNOSIS — F43.23 ADJUSTMENT DISORDER WITH MIXED ANXIETY AND DEPRESSED MOOD: Primary | ICD-10-CM

## 2024-12-04 NOTE — TELEPHONE ENCOUNTER
Reason for call:   [x] Refill   [] Prior Auth  [] Other:     Office:   [x] PCP/Provider -   [] Specialty/Provider -     Medication: PARoxetine (PAXIL) 20 mg tablet Take 20 mg by mouth daily,       Pharmacy: [facility]/pharmacy #[id number] - [place], [place] - [address]      Does the patient have enough for 3 days?   [x] Yes   [] No - Send as HP to POD

## 2024-12-05 RX ORDER — PAROXETINE 20 MG/1
20 TABLET, FILM COATED ORAL DAILY
Qty: 90 TABLET | Refills: 1 | Status: SHIPPED | OUTPATIENT
Start: 2024-12-05

## 2024-12-09 ENCOUNTER — TELEPHONE (OUTPATIENT)
Age: 44
End: 2024-12-09

## 2024-12-09 NOTE — TELEPHONE ENCOUNTER
Pt called in after receiving a bill from "[employer] for $261 for his visit on 11/4/24. Pt states that isn't his insurance and [employer] is the correct plan. I was able to provide the pt with our billing department phone number.   I believe the correct insurance was billed, as I see [employer] for the insurance used for this visit.     Please advise, thank you

## 2025-01-27 ENCOUNTER — TELEPHONE (OUTPATIENT)
Age: 45
End: 2025-01-27

## 2025-01-27 NOTE — TELEPHONE ENCOUNTER
Patient called in state woke up with a bruise size of thumb on waistline and having discomfort when moving and would like to see a provider today. Offer next available appointment for 01/28 patient declined. Please advise. Thank you.

## 2025-01-29 ENCOUNTER — NURSE TRIAGE (OUTPATIENT)
Age: 45
End: 2025-01-29

## 2025-01-29 DIAGNOSIS — K21.9 GASTROESOPHAGEAL REFLUX DISEASE, UNSPECIFIED WHETHER ESOPHAGITIS PRESENT: ICD-10-CM

## 2025-01-29 RX ORDER — PANTOPRAZOLE SODIUM 40 MG/1
40 TABLET, DELAYED RELEASE ORAL DAILY
Qty: 90 TABLET | Refills: 1 | Status: SHIPPED | OUTPATIENT
Start: 2025-01-29

## 2025-01-29 NOTE — TELEPHONE ENCOUNTER
"Pt called in stating that he has a lump on his waist line with is red, swollen and inflamed. Pt states that it is painful and it did open today and is draining white/yellow fluid. Pt was seen at  yesterday and given an antibiotic. Pt scheduled for OV tomorrow.     Reason for Disposition   Patient wants to be seen    Answer Assessment - Initial Assessment Questions  1. APPEARANCE of SWELLING: \"What does it look like?\"      Red, inflamed and swollen  2. SIZE: \"How large is the swelling?\" (e.g., inches, cm; or compare to size of pinhead, tip of pen, eraser, coin, pea, grape, ping pong ball)       quarter  3. LOCATION: \"Where is the swelling located?\"      Waist line  4. ONSET: \"When did the swelling start?\"      1 week  5. COLOR: \"What color is it?\" \"Is there more than one color?\"      Red  6. PAIN: \"Is there any pain?\" If Yes, ask: \"How bad is the pain?\" (Scale 1-10; or mild, moderate, severe)        Moderate  7. ITCH: \"Does it itch?\" If Yes, ask: \"How bad is the itch?\"       Denies  8. CAUSE: \"What do you think caused the swelling?\"      unsure  9 OTHER SYMPTOMS: \"Do you have any other symptoms?\" (e.g., fever)      White discharge    Protocols used: Skin Lump or Localized Swelling-Adult-[place]    "

## 2025-01-29 NOTE — TELEPHONE ENCOUNTER
"Regarding: Painful bleeding bruise on waist  ----- Message from Fanny CASAS sent at 1/29/2025  9:51 AM EST -----  \"Since Friday I've had this red rojelio or bruise on my waistline and it's painful and started bleeding. I have an appt for tomorrow morning.\"    "

## 2025-01-30 ENCOUNTER — OFFICE VISIT (OUTPATIENT)
Dept: FAMILY MEDICINE CLINIC | Facility: CLINIC | Age: 45
End: 2025-01-30
Payer: COMMERCIAL

## 2025-01-30 ENCOUNTER — CONSULT (OUTPATIENT)
Dept: SURGERY | Facility: CLINIC | Age: 45
End: 2025-01-30
Payer: COMMERCIAL

## 2025-01-30 VITALS
DIASTOLIC BLOOD PRESSURE: 80 MMHG | WEIGHT: 186 LBS | SYSTOLIC BLOOD PRESSURE: 130 MMHG | BODY MASS INDEX: 24.65 KG/M2 | TEMPERATURE: 97.3 F | HEIGHT: 73 IN | HEART RATE: 56 BPM | OXYGEN SATURATION: 98 %

## 2025-01-30 VITALS
WEIGHT: 183 LBS | HEART RATE: 53 BPM | TEMPERATURE: 95.7 F | OXYGEN SATURATION: 97 % | DIASTOLIC BLOOD PRESSURE: 70 MMHG | HEIGHT: 73 IN | BODY MASS INDEX: 24.25 KG/M2 | SYSTOLIC BLOOD PRESSURE: 120 MMHG | RESPIRATION RATE: 18 BRPM

## 2025-01-30 DIAGNOSIS — L72.3 SEBACEOUS CYST: Primary | ICD-10-CM

## 2025-01-30 DIAGNOSIS — L72.3 SEBACEOUS CYST: ICD-10-CM

## 2025-01-30 PROCEDURE — 99213 OFFICE O/P EST LOW 20 MIN: CPT | Performed by: NURSE PRACTITIONER

## 2025-01-30 PROCEDURE — 99242 OFF/OP CONSLTJ NEW/EST SF 20: CPT | Performed by: SURGERY

## 2025-01-30 PROCEDURE — 10060 I&D ABSCESS SIMPLE/SINGLE: CPT | Performed by: SURGERY

## 2025-01-30 RX ORDER — DOXYCYCLINE 100 MG/1
CAPSULE ORAL
COMMUNITY
Start: 2025-01-27

## 2025-01-30 NOTE — PATIENT INSTRUCTIONS
Take the packing out in the morning and shower the area twice a day  Dry dressing until it closes  Finished antibiotics

## 2025-01-30 NOTE — PROGRESS NOTES
"Name: Vinay Crane III      : 1980      MRN: [id number]  Encounter Provider: Robert Bloch, MD  Encounter Date: 2025   Encounter department: [facility] GENERAL SURG[facility]  :  Assessment & Plan  Sebaceous cyst    Orders:    Ambulatory Referral to General Surgery        History of Present Illness   HPI  Vinay Crane III is a 44 y.o. male who presents with a 1 week history of a painful swelling in the lower abdominal wall.  He was seen at a [facility] center and given antibiotics and told to see his primary.  Primary sent him in here for incision and drainage  History obtained from: patient    Review of Systems  Pertinent Medical History   See below    Past Medical History   Past Medical History:   Diagnosis Date    Renal calculi      Past Surgical History:   Procedure Laterality Date    VASECTOMY       Family History   Problem Relation Age of Onset    Diabetes Mother     No Known Problems Father       reports that he has never smoked. He has been exposed to tobacco smoke. He has never used smokeless tobacco. He reports current alcohol use. He reports that he does not use drugs.  Current Outpatient Medications on File Prior to Visit   Medication Sig Dispense Refill    doxycycline hyclate (VIBRAMYCIN) 100 mg capsule       pantoprazole (PROTONIX) 40 mg tablet TAKE 1 TABLET BY MOUTH EVERY DAY 90 tablet 1    PARoxetine (PAXIL) 20 mg tablet Take 1 tablet (20 mg total) by mouth daily 90 tablet 1     No current facility-administered medications on file prior to visit.     Allergies   Allergen Reactions    Sulfa Antibiotics          Objective   /80 (BP Location: Left arm, Patient Position: Sitting, Cuff Size: Standard)   Pulse 56   Temp (!) 97.3 °F (36.3 °C) (Tympanic)   Ht 6' 1\" (1.854 m)   Wt 84.4 kg (186 lb)   SpO2 98%   BMI 24.54 kg/m²      Physical Exam  Constitutional:       Appearance: Normal appearance. He is normal weight.   HENT:      Head: Normocephalic and atraumatic.   Eyes:      " "General: No scleral icterus.     Conjunctiva/sclera: Conjunctivae normal.   Musculoskeletal:         General: Normal range of motion.      Cervical back: Normal range of motion.   Skin:     Findings: Erythema present.             Comments: 4 to 5 cm area of redness with a 1.5 cm raised area in the middle   Neurological:      Mental Status: He is alert and oriented to person, place, and time.   Psychiatric:         Mood and Affect: Mood normal.         Behavior: Behavior normal.         Thought Content: Thought content normal.         Judgment: Judgment normal.     Incision and Drainage    Date/Time: 1/30/2025 2:00 PM    Performed by: Robert Bloch, MD  Authorized by: Robert Bloch, MD  Universal Protocol:  Consent: Written consent obtained.  Time out: Immediately prior to procedure a \"time out\" was called to verify the correct patient, procedure, equipment, support staff and site/side marked as required.  Patient identity confirmed: verbally with patient    Patient location:  Clinic  Location:     Type:  Abscess and cyst    Location:  Trunk    Trunk location:  Abdomen  Pre-procedure details:     Skin preparation:  Chloraprep  Procedure details:     Complexity:  Intermediate    Incision types:  Elliptical    Scalpel blade:  15    Approach:  Open    Incision depth:  Subcutaneous    Wound management:  Probed and deloculated    Drainage:  Purulent    Drainage amount:  Moderate    Packing materials:  1/2 in gauze  Comments:      Patient was identified by me placed in supine position upon the operating room table.  Area was prepped and draped in a normal surgical manner.  1% lidocaine with epinephrine is now infused as a local anesthetic.  Elliptical skin incision is made over the fluctuant area.  I entered into an abscessed sebaceous cyst.  Sebum and pus were removed plus some of the cyst wall that I could easily get.  Wound was packed and a dressing applied         "

## 2025-01-30 NOTE — PROGRESS NOTES
Name: Vinay Crane III      : 1980      MRN: [id number]  Encounter Provider: MARY Caban  Encounter Date: 2025   Encounter department: [facility]    Assessment & Plan  Sebaceous cyst    Orders:    Ambulatory Referral to General Surgery; Future      Depression Screening and Follow-up Plan: Patient was screened for depression during today's encounter. They screened negative with a PHQ-9 score of 0.        History of Present Illness     Here for cyst right lower abdomen  Started as ingrown hair and cyst  Painful  Did get blood and pus from it  Went to urgent care  Started on doxycycline  Still not resolving, painful  Getting larger again  Here for eval  No fever or chills        Review of Systems   Constitutional:  Negative for chills and fever.   Respiratory:  Negative for cough, shortness of breath and wheezing.    Cardiovascular:  Negative for chest pain and palpitations.   Gastrointestinal:  Negative for constipation, diarrhea and vomiting.   Skin:  Positive for wound.   Neurological:  Negative for dizziness and headaches.   Hematological:  Negative for adenopathy.   Psychiatric/Behavioral:  Negative for dysphoric mood and sleep disturbance. The patient is not nervous/anxious.      Past Medical History:   Diagnosis Date    Renal calculi      Past Surgical History:   Procedure Laterality Date    VASECTOMY       Family History   Problem Relation Age of Onset    Diabetes Mother     No Known Problems Father      Social History     Tobacco Use    Smoking status: Never     Passive exposure: Past    Smokeless tobacco: Never   Vaping Use    Vaping status: Never Used   Substance and Sexual Activity    Alcohol use: Yes     Comment: social    Drug use: Never    Sexual activity: Not on file     Current Outpatient Medications on File Prior to Visit   Medication Sig    doxycycline hyclate (VIBRAMYCIN) 100 mg capsule     pantoprazole (PROTONIX) 40 mg tablet TAKE 1 TABLET BY MOUTH  "EVERY DAY    PARoxetine (PAXIL) 20 mg tablet Take 1 tablet (20 mg total) by mouth daily     Allergies   Allergen Reactions    Sulfa Antibiotics      Immunization History   Administered Date(s) Administered    COVID-19 MODERNA VACC 0.5 ML IM 04/19/2021, 05/17/2021    COVID-19 Pfizer mRNA vacc PF renetta-sucrose 12 yr and older (Comirnaty) 11/03/2023, 10/25/2024    INFLUENZA 10/08/2019, 10/06/2020, 10/12/2021    Influenza Injectable, MDCK, Preservative Free, 0.5 mL 10/25/2024    Influenza, seasonal, injectable 10/24/2017    Tdap 09/18/2015     Objective   /70 (BP Location: Left arm, Patient Position: Sitting, Cuff Size: Large)   Pulse (!) 53   Temp (!) 95.7 °F (35.4 °C) (Tympanic)   Resp 18   Ht 6' 1\" (1.854 m)   Wt 83 kg (183 lb)   SpO2 97%   BMI 24.14 kg/m²     Physical Exam  Vitals and nursing note reviewed.   Constitutional:       Appearance: Normal appearance.   HENT:      Head: Normocephalic and atraumatic.   Eyes:      Conjunctiva/sclera: Conjunctivae normal.   Cardiovascular:      Rate and Rhythm: Normal rate and regular rhythm.      Heart sounds: Normal heart sounds.   Musculoskeletal:         General: Normal range of motion.      Cervical back: Normal range of motion.   Skin:     General: Skin is warm and dry.             Comments: Bloody drainage from wound  Tender to palpation     Neurological:      Mental Status: He is alert.         " Negative

## 2025-06-01 DIAGNOSIS — F43.23 ADJUSTMENT DISORDER WITH MIXED ANXIETY AND DEPRESSED MOOD: ICD-10-CM

## 2025-06-01 RX ORDER — PAROXETINE 20 MG/1
20 TABLET, FILM COATED ORAL DAILY
Qty: 90 TABLET | Refills: 1 | Status: SHIPPED | OUTPATIENT
Start: 2025-06-01

## 2025-06-02 ENCOUNTER — PATIENT MESSAGE (OUTPATIENT)
Dept: FAMILY MEDICINE CLINIC | Facility: CLINIC | Age: 45
End: 2025-06-02

## 2025-06-02 DIAGNOSIS — B02.9 HERPES ZOSTER WITHOUT COMPLICATION: Primary | ICD-10-CM

## 2025-07-26 DIAGNOSIS — K21.9 GASTROESOPHAGEAL REFLUX DISEASE, UNSPECIFIED WHETHER ESOPHAGITIS PRESENT: ICD-10-CM

## 2025-07-29 RX ORDER — PANTOPRAZOLE SODIUM 40 MG/1
40 TABLET, DELAYED RELEASE ORAL DAILY
Qty: 90 TABLET | Refills: 1 | Status: SHIPPED | OUTPATIENT
Start: 2025-07-29